# Patient Record
Sex: MALE | Race: WHITE | URBAN - METROPOLITAN AREA
[De-identification: names, ages, dates, MRNs, and addresses within clinical notes are randomized per-mention and may not be internally consistent; named-entity substitution may affect disease eponyms.]

---

## 2018-04-09 ENCOUNTER — HOSPITAL ENCOUNTER (EMERGENCY)
Facility: HOSPITAL | Age: 38
Discharge: LEFT BEFORE DISCHARGE | End: 2018-04-09
Payer: COMMERCIAL

## 2018-04-09 VITALS
DIASTOLIC BLOOD PRESSURE: 80 MMHG | HEIGHT: 78 IN | SYSTOLIC BLOOD PRESSURE: 173 MMHG | BODY MASS INDEX: 20.83 KG/M2 | OXYGEN SATURATION: 99 % | RESPIRATION RATE: 18 BRPM | WEIGHT: 180 LBS | HEART RATE: 77 BPM | TEMPERATURE: 97.9 F

## 2018-04-09 LAB
ALBUMIN SERPL BCP-MCNC: 3.9 G/DL (ref 3.5–5)
ALP SERPL-CCNC: 68 U/L (ref 46–116)
ALT SERPL W P-5'-P-CCNC: 30 U/L (ref 12–78)
ANION GAP SERPL CALCULATED.3IONS-SCNC: 4 MMOL/L (ref 4–13)
APTT PPP: 26 SECONDS (ref 23–35)
AST SERPL W P-5'-P-CCNC: 13 U/L (ref 5–45)
ATRIAL RATE: 73 BPM
BASOPHILS # BLD MANUAL: 0 THOUSAND/UL (ref 0–0.1)
BASOPHILS NFR MAR MANUAL: 0 % (ref 0–1)
BILIRUB SERPL-MCNC: 0.39 MG/DL (ref 0.2–1)
BUN SERPL-MCNC: 14 MG/DL (ref 5–25)
CALCIUM SERPL-MCNC: 8.8 MG/DL
CHLORIDE SERPL-SCNC: 107 MMOL/L (ref 100–108)
CO2 SERPL-SCNC: 27 MMOL/L (ref 21–32)
CREAT SERPL-MCNC: 0.9 MG/DL (ref 0.6–1.3)
EOSINOPHIL # BLD MANUAL: 0.18 THOUSAND/UL (ref 0–0.4)
EOSINOPHIL NFR BLD MANUAL: 4 % (ref 0–6)
ERYTHROCYTE [DISTWIDTH] IN BLOOD BY AUTOMATED COUNT: 13.2 % (ref 11.6–15.1)
GFR SERPL CREATININE-BSD FRML MDRD: 108 ML/MIN/1.73SQ M
GLUCOSE SERPL-MCNC: 84 MG/DL (ref 65–140)
HCT VFR BLD AUTO: 49.3 % (ref 36.5–49.3)
HGB BLD-MCNC: 17.3 G/DL (ref 12–17)
INR PPP: 0.95 (ref 0.86–1.16)
LYMPHOCYTES # BLD AUTO: 1.57 THOUSAND/UL (ref 0.6–4.47)
LYMPHOCYTES # BLD AUTO: 34 % (ref 14–44)
MCH RBC QN AUTO: 30.9 PG (ref 26.8–34.3)
MCHC RBC AUTO-ENTMCNC: 35.1 G/DL (ref 31.4–37.4)
MCV RBC AUTO: 88 FL (ref 82–98)
MONOCYTES # BLD AUTO: 0.09 THOUSAND/UL (ref 0–1.22)
MONOCYTES NFR BLD: 2 % (ref 4–12)
NEUTROPHILS # BLD MANUAL: 2.77 THOUSAND/UL (ref 1.85–7.62)
NEUTS SEG NFR BLD AUTO: 60 % (ref 43–75)
NRBC BLD AUTO-RTO: 0 /100 WBCS
P AXIS: 69 DEGREES
PLATELET # BLD AUTO: 180 THOUSANDS/UL (ref 149–390)
PLATELET BLD QL SMEAR: ADEQUATE
PMV BLD AUTO: 9.6 FL (ref 8.9–12.7)
POTASSIUM SERPL-SCNC: 3.5 MMOL/L (ref 3.5–5.3)
PR INTERVAL: 166 MS
PROT SERPL-MCNC: 7.9 G/DL (ref 6.4–8.2)
PROTHROMBIN TIME: 12.7 SECONDS (ref 12.1–14.4)
QRS AXIS: 90 DEGREES
QRSD INTERVAL: 98 MS
QT INTERVAL: 386 MS
QTC INTERVAL: 425 MS
RBC # BLD AUTO: 5.6 MILLION/UL (ref 3.88–5.62)
RBC MORPH BLD: NORMAL
SODIUM SERPL-SCNC: 138 MMOL/L (ref 136–145)
T WAVE AXIS: 65 DEGREES
TOTAL CELLS COUNTED SPEC: 100
TROPONIN I SERPL-MCNC: <0.02 NG/ML
VENTRICULAR RATE: 73 BPM
WBC # BLD AUTO: 4.62 THOUSAND/UL (ref 4.31–10.16)

## 2018-04-09 PROCEDURE — 93005 ELECTROCARDIOGRAM TRACING: CPT

## 2018-04-09 PROCEDURE — 93010 ELECTROCARDIOGRAM REPORT: CPT | Performed by: INTERNAL MEDICINE

## 2018-04-09 PROCEDURE — 80053 COMPREHEN METABOLIC PANEL: CPT

## 2018-04-09 PROCEDURE — 85610 PROTHROMBIN TIME: CPT

## 2018-04-09 PROCEDURE — 85730 THROMBOPLASTIN TIME PARTIAL: CPT

## 2018-04-09 PROCEDURE — 84484 ASSAY OF TROPONIN QUANT: CPT

## 2018-04-09 PROCEDURE — 85007 BL SMEAR W/DIFF WBC COUNT: CPT

## 2018-04-09 PROCEDURE — 36415 COLL VENOUS BLD VENIPUNCTURE: CPT

## 2018-04-09 PROCEDURE — 85027 COMPLETE CBC AUTOMATED: CPT

## 2019-04-22 ENCOUNTER — OFFICE VISIT (OUTPATIENT)
Dept: FAMILY MEDICINE CLINIC | Facility: CLINIC | Age: 39
End: 2019-04-22
Payer: COMMERCIAL

## 2019-04-22 VITALS
HEART RATE: 68 BPM | RESPIRATION RATE: 20 BRPM | BODY MASS INDEX: 20.82 KG/M2 | WEIGHT: 171 LBS | TEMPERATURE: 97.9 F | SYSTOLIC BLOOD PRESSURE: 126 MMHG | DIASTOLIC BLOOD PRESSURE: 82 MMHG | HEIGHT: 76 IN | OXYGEN SATURATION: 97 %

## 2019-04-22 DIAGNOSIS — Z00.00 WELL ADULT EXAM: Primary | ICD-10-CM

## 2019-04-22 DIAGNOSIS — F17.210 CIGARETTE NICOTINE DEPENDENCE WITHOUT COMPLICATION: ICD-10-CM

## 2019-04-22 DIAGNOSIS — F25.9 SCHIZOAFFECTIVE DISORDER, UNSPECIFIED TYPE (HCC): ICD-10-CM

## 2019-04-22 PROCEDURE — 3725F SCREEN DEPRESSION PERFORMED: CPT | Performed by: FAMILY MEDICINE

## 2019-04-22 PROCEDURE — 99385 PREV VISIT NEW AGE 18-39: CPT | Performed by: FAMILY MEDICINE

## 2022-04-26 ENCOUNTER — TELEPHONE (OUTPATIENT)
Dept: FAMILY MEDICINE CLINIC | Facility: CLINIC | Age: 42
End: 2022-04-26

## 2022-04-26 NOTE — TELEPHONE ENCOUNTER
Patient's mother, Autumn Stoner ([phone]) called on behalf of patient who was present for call  Patient was summoned to [place] duty but is unable to attend due to diagnosis of schizophrenia  Requesting doctors note confirming patient's diagnosis to be excused  Patient was seen once in 4/22/2019 to be established and has not returned  Patient's mother states she is aware he has not kept up with appointments but asked if it would be possible to write note confirming diagenesis  Provided patient's [place] ID#: [id number]  Request note be sent to [facility] Fax#:[phone]  Request call to confirm if sent or to notify if unable to send

## 2022-04-26 NOTE — TELEPHONE ENCOUNTER
Please type a letter  Dx : Schizoaffective disorder      Please call patient's mother to schedule office visit ( can be seen by Dr Sydney Chaparro)

## 2024-02-21 PROBLEM — Z00.00 WELL ADULT EXAM: Status: RESOLVED | Noted: 2019-04-22 | Resolved: 2024-02-21

## 2025-01-31 ENCOUNTER — TELEPHONE (OUTPATIENT)
Dept: FAMILY MEDICINE CLINIC | Facility: CLINIC | Age: 45
End: 2025-01-31

## 2025-01-31 NOTE — TELEPHONE ENCOUNTER
01/31/25 3:59 PM    Hello    The office’s request has been received. If a patient has not been seen in within last 3 years, 3 phone calls and a certified letter (must be letter listed in workflow) are to be sent. Once that workflow is completed, please resend PCP removal request with date letter was sent. PCP will be removed 30 days after certified letter sent (if patient doesn’t respond/scheduled with office).      Thank you  Bennie Baez

## 2025-01-31 NOTE — TELEPHONE ENCOUNTER
Please do a patient attribution to remove Dr. Lubin as patient's PCP - he was last seen in 2019 and she is retired.

## 2025-06-12 ENCOUNTER — VBI (OUTPATIENT)
Dept: ADMINISTRATIVE | Facility: OTHER | Age: 45
End: 2025-06-12

## 2025-06-12 NOTE — TELEPHONE ENCOUNTER
06/12/25 9:23 AM     Chart reviewed for CRC: Colonoscopy ; nothing is submitted to the patient's insurance at this time.     Carmelina Floyd   [facility]

## 2025-06-23 ENCOUNTER — TELEPHONE (OUTPATIENT)
Age: 45
End: 2025-06-23

## 2025-06-23 NOTE — TELEPHONE ENCOUNTER
Pt mom called to sched appt for pt. Pt has not been seen in office since 2019. Mom stated pt is never sick. Pt is sched for first avail NP appt with Gabriela Dickerson. Mom stated pt legs are a different color, brownish, pt mom was offered to speak to clin staff for further advice, or to put appt on wait list, pt mom declined both stating this has been there for a while.

## 2025-06-26 ENCOUNTER — OFFICE VISIT (OUTPATIENT)
Dept: FAMILY MEDICINE CLINIC | Facility: CLINIC | Age: 45
End: 2025-06-26
Payer: COMMERCIAL

## 2025-06-26 VITALS
SYSTOLIC BLOOD PRESSURE: 128 MMHG | DIASTOLIC BLOOD PRESSURE: 84 MMHG | OXYGEN SATURATION: 95 % | RESPIRATION RATE: 18 BRPM | WEIGHT: 208 LBS | HEIGHT: 76 IN | HEART RATE: 82 BPM | BODY MASS INDEX: 25.33 KG/M2 | TEMPERATURE: 98 F

## 2025-06-26 DIAGNOSIS — Z12.11 COLON CANCER SCREENING: ICD-10-CM

## 2025-06-26 DIAGNOSIS — F17.210 CIGARETTE NICOTINE DEPENDENCE WITHOUT COMPLICATION: Primary | ICD-10-CM

## 2025-06-26 DIAGNOSIS — Z13.1 SCREENING FOR DIABETES MELLITUS: ICD-10-CM

## 2025-06-26 DIAGNOSIS — Z11.4 SCREENING FOR HIV (HUMAN IMMUNODEFICIENCY VIRUS): ICD-10-CM

## 2025-06-26 DIAGNOSIS — Z11.59 NEED FOR HEPATITIS C SCREENING TEST: ICD-10-CM

## 2025-06-26 DIAGNOSIS — F10.90 ALCOHOL USE: ICD-10-CM

## 2025-06-26 DIAGNOSIS — Z13.6 SCREENING FOR CARDIOVASCULAR CONDITION: ICD-10-CM

## 2025-06-26 DIAGNOSIS — I73.9 PERIPHERAL ARTERIAL DISEASE (HCC): ICD-10-CM

## 2025-06-26 DIAGNOSIS — R14.0 ABDOMINAL DISTENTION: ICD-10-CM

## 2025-06-26 DIAGNOSIS — Z13.29 SCREENING FOR THYROID DISORDER: ICD-10-CM

## 2025-06-26 DIAGNOSIS — F25.9 SCHIZOAFFECTIVE DISORDER, UNSPECIFIED TYPE (HCC): ICD-10-CM

## 2025-06-26 PROCEDURE — 99386 PREV VISIT NEW AGE 40-64: CPT | Performed by: NURSE PRACTITIONER

## 2025-06-26 NOTE — ASSESSMENT & PLAN NOTE
Patient continues to smoke since he was a teenager. He does roll his own cigarettes. He states he smokes less than 20 cigarettes daily, Encouraged smoking cessation.  Orders:    CBC and differential; Future    Comprehensive metabolic panel; Future

## 2025-06-26 NOTE — ASSESSMENT & PLAN NOTE
Patient presents to the office today accompanied by his mother. He has a diagnosis of schizophrenia. She is concerned for lower extremity discoloration, cramping and cold feet. Patient states his symptoms have been ongoing for several years. He continues to smoke.  Exam does show PVD discoloration. Decreased pedal pulses. Encouraged smoking cessation. Arterial dopplers ordered.   Orders:    VAS ARTERIAL DUPLEX- LOWER LIMB BILATERAL; Future

## 2025-06-26 NOTE — ASSESSMENT & PLAN NOTE
"Patient with abdominal distention and \"hardness\" worsening over the past several months. He does drink approximately 24-30 rum drinks weekly. Drinking about three days per week.  Patient states abdominal distention is worsening. No pain, sometimes hard to touch. Exam does show abdominal distention with some firmness to abdomen. Encouraged the patient to cut back on alcohol use. Ultrasound of liver and blood work ordered.   Orders:    US abdomen complete; Future    "

## 2025-06-26 NOTE — ASSESSMENT & PLAN NOTE
Untreated at present. No longer follows with psychiatry. Patient does not work, on disability. Lives with mother.

## 2025-06-26 NOTE — PROGRESS NOTES
"Name: Dann Minaya      : 1980      MRN: [id number]  Encounter Provider: ARTURO Cheney  Encounter Date: 2025   Encounter department: [facility]    Assessment & Plan  Need for hepatitis C screening test    Orders:    Hepatitis C Antibody; Future    Screening for HIV (human immunodeficiency virus)    Orders:    HIV 1/2 AG/AB w Reflex [facility] for 2 yr old and above; Future    Cigarette nicotine dependence without complication  Patient continues to smoke since he was a teenager. He does roll his own cigarettes. He states he smokes less than 20 cigarettes daily, Encouraged smoking cessation.  Orders:    CBC and differential; Future    Comprehensive metabolic panel; Future    Schizoaffective disorder, unspecified type (HCC)  Untreated at present. No longer follows with psychiatry. Patient does not work, on disability. Lives with mother.        Screening for cardiovascular condition    Orders:    Lipid Panel with Direct LDL reflex; Future    Screening for diabetes mellitus    Orders:    Hemoglobin A1C; Future    Screening for thyroid disorder    Orders:    TSH, 3rd generation with Free T4 reflex; Future    Peripheral arterial disease (HCC)  Patient presents to the office today accompanied by his mother. He has a diagnosis of schizophrenia. She is concerned for lower extremity discoloration, cramping and cold feet. Patient states his symptoms have been ongoing for several years. He continues to smoke.  Exam does show PVD discoloration. Decreased pedal pulses. Encouraged smoking cessation. Arterial dopplers ordered.   Orders:    VAS ARTERIAL DUPLEX- LOWER LIMB BILATERAL; Future    Abdominal distention  Patient with abdominal distention and \"hardness\" worsening over the past several months. He does drink approximately 24-30 rum drinks weekly. Drinking about three days per week.  Patient states abdominal distention is worsening. No pain, sometimes hard to touch. Exam does show " abdominal distention with some firmness to abdomen. Encouraged the patient to cut back on alcohol use. Ultrasound of liver and blood work ordered.   Orders:    US abdomen complete; Future    Alcohol use    Orders:    US abdomen complete; Future    Colon cancer screening    Orders:    Cologuard      Depression Screening and Follow-up Plan: Patient was screened for depression during today's encounter. They screened negative with a PHQ-2 score of 0.      Tobacco Cessation Counseling: Tobacco cessation counseling was provided. The patient is sincerely urged to quit consumption of tobacco. He is not ready to quit tobacco. Rolls his own cigarettes. Recommend smoking cessation         History of Present Illness     Patient presents to the office today to re establish care. Last seen in 2019.  Past medical history updated. New concerns of lower extremity discoloration, abdominal distention, claudication and cold lower extremities.  Due for blood work. Does not want any covid vaccines.       Review of Systems   Constitutional: Negative.  Negative for fatigue.   HENT: Negative.  Negative for congestion, postnasal drip, rhinorrhea and trouble swallowing.    Eyes: Negative.  Negative for visual disturbance.   Respiratory: Negative.  Negative for choking and shortness of breath.    Cardiovascular: Negative.  Negative for chest pain.   Gastrointestinal:  Positive for abdominal distention.   Endocrine: Negative.    Genitourinary: Negative.    Musculoskeletal:  Positive for myalgias. Negative for arthralgias, back pain and neck pain.   Skin:  Positive for color change.   Neurological:  Negative for dizziness and headaches.   Psychiatric/Behavioral: Negative.       Past Medical History[1]  Past Surgical History[2]  Family History[3]  Social History[4]  Medications[5]  No Known Allergies    There is no immunization history on file for this patient.  Objective   /84   Pulse 82   Temp 98 °F (36.7 °C) (Temporal)   Resp 18   Ht  "6' 4\" (1.93 m)   Wt 94.3 kg (208 lb)   SpO2 95%   BMI 25.32 kg/m²     Physical Exam  Vitals and nursing note reviewed.   Constitutional:       Appearance: Normal appearance. He is well-developed and normal weight.   HENT:      Head: Normocephalic and atraumatic.      Right Ear: Tympanic membrane, ear canal and external ear normal. There is no impacted cerumen.      Left Ear: Tympanic membrane, ear canal and external ear normal. There is no impacted cerumen.      Nose: Nose normal.      Mouth/Throat:      Mouth: Mucous membranes are moist.      Dentition: Has dentures.      Pharynx: Oropharynx is clear.     Eyes:      Extraocular Movements: Extraocular movements intact.      Conjunctiva/sclera: Conjunctivae normal.      Pupils: Pupils are equal, round, and reactive to light.       Cardiovascular:      Rate and Rhythm: Normal rate and regular rhythm.      Pulses: Normal pulses.      Heart sounds: Normal heart sounds. No murmur heard.  Pulmonary:      Effort: Pulmonary effort is normal. No respiratory distress.      Breath sounds: Normal breath sounds.   Abdominal:      General: Abdomen is protuberant. Bowel sounds are normal. There is distension.      Palpations: Abdomen is rigid. There is no mass.      Tenderness: There is no abdominal tenderness. There is no guarding or rebound.      Hernia: No hernia is present.     Musculoskeletal:         General: Normal range of motion.      Cervical back: Normal range of motion and neck supple.     Skin:     General: Skin is warm and dry.          Neurological:      General: No focal deficit present.      Mental Status: He is alert and oriented to person, place, and time. Mental status is at baseline.     Psychiatric:         Attention and Perception: Attention and perception normal.         Mood and Affect: Mood normal.         Speech: Speech normal.         Behavior: Behavior normal. Behavior is cooperative.         Thought Content: Thought content normal.         " Cognition and Memory: Cognition and memory normal.         Judgment: Judgment normal.                [1]   Past Medical History:  Diagnosis Date    Autism     Schizoaffective disorder (HCC)     Schizophrenia (HCC)    [2] No past surgical history on file.  [3]   Family History  Problem Relation Name Age of Onset    No Known Problems Mother Rajani     Testicular cancer Brother      Leukemia Maternal Grandfather      Emphysema Maternal Grandfather     [4]   Social History  Tobacco Use    Smoking status: Every Day     Current packs/day: 1.00     Average packs/day: 1 pack/day for 28.5 years (28.5 ttl pk-yrs)     Types: Cigarettes     Start date: 1997     Passive exposure: Current    Smokeless tobacco: Never   Vaping Use    Vaping status: Never Used   Substance and Sexual Activity    Alcohol use: Yes     Alcohol/week: 24.0 standard drinks of alcohol     Types: 24 Shots of liquor per week     Comment: socially    Drug use: No    Sexual activity: Not Currently   [5]   No current outpatient medications on file prior to visit.

## 2025-06-26 NOTE — PATIENT INSTRUCTIONS
"  Patient Education     Alcohol use disorder - Discharge instructions   The Basics   Written by the doctors and editors at [facility]   What are discharge instructions? -- Discharge instructions are information about how to take care of yourself after getting medical care for a health problem.  What is alcohol use disorder? -- This is the medical term for alcohol addiction or what most people think of as alcoholism. Alcohol problems are common, but there are treatments that can help.  Some people lose control of their drinking. They drink more than they mean to. Others find that they need more and more alcohol to get the same effects. Some people notice symptoms if they drink less. This means that their brain and body are physically addicted to alcohol. They have a strong need or craving to drink alcohol. Some people cannot stop or limit their drinking once they start.  People can have trouble with alcohol when they drink too much, too fast, or too often. A person can be at risk for accidents and problems if they drink too much, even if they do not have alcohol use disorder.  Can I stop drinking on my own? -- Many people are able to cut back on drinking on their own. But if you have been drinking several days a week for weeks in a row, do not try to drink less without the help of a doctor or nurse. Stopping or reducing drinking too quickly can cause something called \"alcohol withdrawal.\" This can cause symptoms and, in some cases, even lead to death.  How do I care for myself at home? -- Ask the doctor what you need to do when you go home. Make sure that you understand exactly what you need to do to care for yourself. Ask questions if there is anything you do not understand.   You might need help to quit or limit drinking. Talk with your doctor or nurse about the best plan for you. You might:   See a counselor, such as a psychologist, , or psychiatrist.   Go to a treatment center or special recovery " "center.   Take part in a recovery program while living at home.   Take part in a support group such as Alcoholics Anonymous (\"AA\").  All of these treatments can help, and they can be combined.   There are many things you can do to manage your drinking and lower your risk for problems. Some examples:   Some people choose to stop drinking alcohol completely. But if you have been drinking several days a week for weeks in a row, do not try to reduce without the help of a doctor or nurse. Stopping drinking abruptly can lead to withdrawal.   Other people continue to drink with some limits. Talk with your doctor or nurse to find the best option for you. If you do drink:   Limit the amount you drink, or alternate your drinks with a glass of water or other non-alcoholic drink.   Do not drink on an empty stomach. Food helps your body absorb alcohol more slowly.   Never drive if you have been drinking.   Talk to a friend or family member that you feel comfortable with and who can help you stay responsible and not drink.   Keep a drink journal. Write down how much you drink, where you were, and anything that might have triggered your drinking. This can help you learn more about your drinking patterns and make changes.   Make some changes in your daily habits, and try a new routine:   Avoid places, people, and situations that bring up thoughts of drinking.   Spend time with people who are not drinking alcohol. It can help to be with people who support your recovery and give you comfort, encouragement, and guidance.  What follow-up care do I need? -- Your doctor or counselor might want to check on your progress. Go to these appointments. Be honest with them about your progress, how you are feeling, and any problems.  When should I call the doctor? -- Alcohol poisoning is an emergency. Call for an ambulance (in the [place] and [place], call 9-1-1) if someone:   Stops breathing, or goes 10 seconds or more without breathing   Breathes " "very slowly (fewer than 8 breaths in 1 minute)   Turns blue or very pale, and their skin feels cool to the touch   Has a seizure   Passes out and cannot be woken up at all   Cannot stop vomiting   Looks very sick   Is not able to stand, or falls over and over again   Has a head injury, or a fall that could lead to a head injury  Delirium tremens (\"DTs\") is the most serious form of alcohol withdrawal. This is also an emergency. Call for an ambulance (in the [place] and [place], call 9-1-1) if someone:   Has hallucinations - This is when a person sees, hears, feels, smells, or tastes things that aren't there.   Is confused about where they are or who they are   Feels very upset and anxious   Has shaking that cannot be controlled   Has a fast heartbeat   Has a fever of 100.4°F (38°C) or higher   Is sweating a lot  Call your doctor for advice if you have milder symptoms of alcohol withdrawal. These might include:   Trouble sleeping   Headache   Nausea  You should also call your doctor if you are having trouble drinking less, or if you have any other symptoms that worry you.  All topics are updated as new evidence becomes available and our peer review process is complete.  This topic retrieved from [id number] on: Apr 11, 2024.  Topic 074739 Version 2.0  Release: 32.3.2 - C32.100  © 2024 UpToDate, Inc. and/or its affiliates. All rights reserved.  Consumer Information Use and Disclaimer   Disclaimer: This generalized information is a limited summary of diagnosis, treatment, and/or medication information. It is not meant to be comprehensive and should be used as a tool to help the user understand and/or assess potential diagnostic and treatment options. It does NOT include all information about conditions, treatments, medications, side effects, or risks that may apply to a specific patient. It is not intended to be medical advice or a substitute for the medical advice, diagnosis, or treatment of a health care provider based on the " "health care provider's examination and assessment of a patient's specific and unique circumstances. Patients must speak with a health care provider for complete information about their health, medical questions, and treatment options, including any risks or benefits regarding use of medications. This information does not endorse any treatments or medications as safe, effective, or approved for treating a specific patient. UpToDate, Inc. and its affiliates disclaim any warranty or liability relating to this information or the use thereof.The use of this information is governed by the Terms of Use, available at https://www.[employer]/en/know/clinical-effectiveness-terms. 2024© UpToDate, Inc. and its affiliates and/or licensors. All rights reserved.  Copyright   © 2024 UpToDate, Inc. and/or its affiliates. All rights reserved.    Patient Education     Peripheral artery disease and claudication   The Basics   Written by the doctors and editors at [facility]   What is peripheral artery disease? -- Peripheral artery disease, or \"PAD,\" is a condition that affects the blood vessels (called arteries) that bring blood to the legs. PAD can cause muscle pain that gets worse with activity and better with rest. This is called \"claudication.\" PAD can also cause leg wounds to heal more slowly than usual. This article is only about the leg pain related to PAD.  Normally, blood flows easily through arteries to all parts of the body. But sometimes, fatty clumps called \"plaque\" build up inside the walls of arteries (figure 1). Plaque can cause arteries to become narrow or blocked. This prevents blood from flowing normally. When muscles do not get enough blood, symptoms can happen.  Some people have a greater chance of getting PAD, such as those who:   Smoke   Have diabetes   Have high cholesterol   Have high blood pressure  What are the symptoms of PAD? -- PAD often causes pain in the back of the lower leg. The pain usually gets " worse with walking or other exercise, and gets better with rest. PAD can also cause pain in the buttocks, thighs, or sometimes in the feet. People who have leg pain can have other symptoms, too, such as:   Trouble walking up stairs   Trouble with sexual arousal  Symptoms of claudication can be mild or severe, depending on:   Which arteries are affected, and how many   How narrow the arteries are   How much activity a person does  Is there a test for PAD? -- Yes. Your doctor or nurse can do different tests to find out if you have PAD, and to check how severe it is. They might:   Take the blood pressure in your arm and lower leg (just above the ankle) - This is done at rest and right after exercise, then the numbers are compared.   Take the blood pressure in other places in your leg, like the thigh   Order a blood vessel imaging test, such as an ultrasound - This can show pictures of the arteries that bring blood to the leg.  How can I help treat my PAD? -- To help treat your PAD and prevent it from getting worse, you can:   Stop smoking.   Get your diabetes, high blood pressure, and high cholesterol under control (if you have these conditions).   Walking - Doctors recommend that most people with PAD walk each day. Ask your doctor or nurse how best to begin a walking program.  What other treatments might I have? -- Along with a walking program and getting medical conditions under control, some people are also treated with medicines. The medicines used to treat PAD can reduce symptoms, increase blood flow to the legs, and help people walk farther without pain. Most doctors ask their patients to try a medicine called cilostazol (brand name: Pletal). But people who have certain heart problems cannot take cilostazol and must take a different medicine.  If you still have severe symptoms after trying medicines, your doctor will talk with you about the possibility of having a procedure to increase blood flow to your legs and  "feet. Your treatment options might include:   Angioplasty or stenting - For these procedures, the doctor inserts a thin tube with a balloon at the end of it into the part of the artery that is blocked. Then, they inflate the balloon to open the blockage. Sometimes, the doctor might need to prop open the artery using a tiny mesh tube called a \"stent,\" which stays in the body.   Open patch angioplasty - This involves cutting open the artery, cleaning out the plaque, then closing or \"patching\" the cut.   Bypass surgery - During bypass surgery, the doctor removes a piece of a vein from another part of the body. Then, they reattach that piece (called a vein graft) above and below the area that is blocked. This re-routes blood around the blocked area, and allows it to get to the part of the leg that was not getting enough blood. Sometimes, instead of taking a vein from another part of the body, the doctor can use an artificial graft.  What should I know about the different procedures? -- Angioplasty and stenting and patch angioplasty work best for treating blocked areas that are short. Surgery works better long term for treating blocked areas that are long. People who have the fewest long-term problems after bypass surgery include those who are younger than 70 and do not have diabetes.  Your doctor might recommend a particular procedure for you, depending on your symptoms, age, and medical problems. But many people can choose which procedure to have. If your doctor offers you a choice, ask:   What are the benefits of each procedure for me?   What are the downsides of each procedure for me?   What happens if I do not have any procedure?  All topics are updated as new evidence becomes available and our peer review process is complete.  This topic retrieved from [employer] on: Feb 26, 2024.  Topic 25407 Version 16.0  Release: 32.2.4 - C32.56  © 2024 UpToDate, Inc. and/or its affiliates. All rights reserved.  figure 1: " Peripheral artery disease     Graphic 14066 Version 6.0  Consumer Information Use and Disclaimer   Disclaimer: This generalized information is a limited summary of diagnosis, treatment, and/or medication information. It is not meant to be comprehensive and should be used as a tool to help the user understand and/or assess potential diagnostic and treatment options. It does NOT include all information about conditions, treatments, medications, side effects, or risks that may apply to a specific patient. It is not intended to be medical advice or a substitute for the medical advice, diagnosis, or treatment of a health care provider based on the health care provider's examination and assessment of a patient's specific and unique circumstances. Patients must speak with a health care provider for complete information about their health, medical questions, and treatment options, including any risks or benefits regarding use of medications. This information does not endorse any treatments or medications as safe, effective, or approved for treating a specific patient. UpToDate, Inc. and its affiliates disclaim any warranty or liability relating to this information or the use thereof.The use of this information is governed by the Terms of Use, available at https://www.[employer].com/en/know/clinical-effectiveness-terms. 2024© UpToDate, Inc. and its affiliates and/or licensors. All rights reserved.  Copyright   © 2024 UpToDate, Inc. and/or its affiliates. All rights reserved.